# Patient Record
Sex: FEMALE | Race: WHITE | Employment: FULL TIME | URBAN - METROPOLITAN AREA
[De-identification: names, ages, dates, MRNs, and addresses within clinical notes are randomized per-mention and may not be internally consistent; named-entity substitution may affect disease eponyms.]

---

## 2017-01-04 ENCOUNTER — OFFICE VISIT (OUTPATIENT)
Dept: FAMILY MEDICINE CLINIC | Facility: CLINIC | Age: 47
End: 2017-01-04

## 2017-01-04 VITALS
WEIGHT: 127 LBS | RESPIRATION RATE: 18 BRPM | BODY MASS INDEX: 21 KG/M2 | HEART RATE: 78 BPM | OXYGEN SATURATION: 98 % | DIASTOLIC BLOOD PRESSURE: 62 MMHG | TEMPERATURE: 98 F | SYSTOLIC BLOOD PRESSURE: 110 MMHG

## 2017-01-04 DIAGNOSIS — J45.31 ASTHMA, ALLERGIC, MILD PERSISTENT, WITH ACUTE EXACERBATION: Primary | ICD-10-CM

## 2017-01-04 DIAGNOSIS — H69.81 EUSTACHIAN TUBE DYSFUNCTION, RIGHT: ICD-10-CM

## 2017-01-04 PROCEDURE — 99213 OFFICE O/P EST LOW 20 MIN: CPT | Performed by: FAMILY MEDICINE

## 2017-01-04 RX ORDER — ALBUTEROL SULFATE 90 UG/1
2 AEROSOL, METERED RESPIRATORY (INHALATION) EVERY 4 HOURS PRN
Qty: 1 INHALER | Refills: 0 | Status: SHIPPED | OUTPATIENT
Start: 2017-01-04 | End: 2017-09-25

## 2017-01-04 RX ORDER — ALBUTEROL SULFATE 90 UG/1
2 AEROSOL, METERED RESPIRATORY (INHALATION) EVERY 4 HOURS PRN
Qty: 3 INHALER | Refills: 1 | Status: SHIPPED | OUTPATIENT
Start: 2017-01-04 | End: 2017-04-04

## 2017-01-04 RX ORDER — ALBUTEROL SULFATE 90 UG/1
2 AEROSOL, METERED RESPIRATORY (INHALATION) EVERY 4 HOURS PRN
Qty: 3 INHALER | Refills: 1 | Status: SHIPPED | OUTPATIENT
Start: 2017-01-04 | End: 2017-01-04 | Stop reason: CLARIF

## 2017-01-04 RX ORDER — PREDNISONE 20 MG/1
20 TABLET ORAL DAILY
Qty: 6 TABLET | Refills: 0 | Status: SHIPPED | OUTPATIENT
Start: 2017-01-04 | End: 2017-01-07

## 2017-01-04 RX ORDER — INHALER, ASSIST DEVICES
SPACER (EA) MISCELLANEOUS
Qty: 1 DEVICE | Refills: 0 | Status: SHIPPED | OUTPATIENT
Start: 2017-01-04

## 2017-01-04 NOTE — PATIENT INSTRUCTIONS
Please start Qvar and use it daily as this controls the inflammation component of your asthma and the proair is a bronchodilator that will open up your airways, if you have inflammation the proair will not help.       Controlling Asthma Triggers: Irritants · Do not use products with bleach and ammonia for cleaning. Try making a cleaning solution with white vinegar, baking soda, or mild dish soap. · Use exhaust fans while cooking. Or open a window if you can.   · Do not use perfumes, air fresheners, potpourri

## 2017-01-04 NOTE — PROGRESS NOTES
Felicia Gloria is a 51year old female. Patient presents with: Follow - Up: asthma is not better after she finished antibiotic for sinus infection.      HPI:   Asthma: patient is seen for follow up, states finished her steroid and has been using her proa hx endoscopy   • Kidney stones 1/1994   • Migraine headache    • Borderline glaucoma    • Irritable bowel syndrome    • Endometriosis    • Depressive disorder, not elsewhere classified      Depression   • Allergic rhinitis due to pollen    • Unspecified as medication    Eustachian tube dysfunction, right  Advised to start fluticasone nasal spray

## 2017-01-05 ENCOUNTER — TELEPHONE (OUTPATIENT)
Dept: FAMILY MEDICINE CLINIC | Facility: CLINIC | Age: 47
End: 2017-01-05

## 2017-01-05 NOTE — TELEPHONE ENCOUNTER
Patient has question on Prednisone dose take 3 days as PCP advised ? Says she got 6 pills take qd on bottle. .  predniSONE 20 MG Oral Tab 6 tablet 0 1/4/2017 1/7/2017     Sig :  Take 1 tablet (20 mg total) by mouth daily. Please advise.

## 2017-01-05 NOTE — TELEPHONE ENCOUNTER
Clarified with dr Sarah Crenshaw  Pt is to take BID x 3 days      Telephone Information:   Mobile [phone] (home) [phone] (work)      Left detailed message for pt with this clarification  Advised to call back with any other questions or concer

## 2017-05-22 NOTE — TELEPHONE ENCOUNTER
No future appointments. LOV 1/17    LAST LAB 1/11    LAST RX    Montelukast Sodium 10 MG Oral Tab 90 tablet 1 10/4/2016     Out of Rx in April.    PROTOCOL  Asthma & COPD Medication Protocol Failed5/21 11:22 PM   Asthma Action Score greater than or equal

## 2017-05-24 NOTE — TELEPHONE ENCOUNTER
Please have patient make a follow up appointment, asthma was uncontrolled at her last visit and she was advised to follow up.

## 2017-05-24 NOTE — TELEPHONE ENCOUNTER
Called patient to schedule an appointment for asthma. Patient states she will have to get back with us. She has hours changing at work and isn't sure of everything at the moment.

## 2017-05-26 RX ORDER — MONTELUKAST SODIUM 10 MG/1
TABLET ORAL
Qty: 30 TABLET | Refills: 0 | Status: SHIPPED | OUTPATIENT
Start: 2017-05-26 | End: 2017-09-25

## 2017-05-26 NOTE — TELEPHONE ENCOUNTER
No future appointments. Left detailed message for patient on phone also informed of where refill will be sent. Rx filled x 1 month. Next refill at appointment.

## 2017-08-20 DIAGNOSIS — J45.40 ASTHMA, MODERATE PERSISTENT, POORLY-CONTROLLED: ICD-10-CM

## 2017-08-22 NOTE — TELEPHONE ENCOUNTER
No future appointments. LOV 1/17 5/17 Raya Dugan          1:46 PM   Note      Called patient to schedule an appointment for asthma. Patient states she will have to get back with us.   She has hours changing at work and isn't sure of everything at the

## 2017-09-07 ENCOUNTER — TELEPHONE (OUTPATIENT)
Dept: FAMILY MEDICINE CLINIC | Facility: CLINIC | Age: 47
End: 2017-09-07

## 2017-09-07 NOTE — TELEPHONE ENCOUNTER
Patient called because she's not sure why she's getting [employer] calls. She states she's not due for a visit. [facility] outreach is for Asthma. Last visit 1/4/2017. Please advise.

## 2017-09-08 NOTE — TELEPHONE ENCOUNTER
Pt notified that calls were regarding Asthma F/U from January  Her breathing was documented as very poor at that OV  We normally want to see pt's back in one month to reassess    Pt appreciated that F/U call but was at work and stated she would call back l

## 2017-09-25 ENCOUNTER — OFFICE VISIT (OUTPATIENT)
Dept: FAMILY MEDICINE CLINIC | Facility: CLINIC | Age: 47
End: 2017-09-25

## 2017-09-25 VITALS
SYSTOLIC BLOOD PRESSURE: 112 MMHG | BODY MASS INDEX: 22 KG/M2 | DIASTOLIC BLOOD PRESSURE: 64 MMHG | HEART RATE: 74 BPM | WEIGHT: 136.38 LBS | RESPIRATION RATE: 18 BRPM | TEMPERATURE: 98 F

## 2017-09-25 DIAGNOSIS — R20.2 PARESTHESIAS IN RIGHT HAND: ICD-10-CM

## 2017-09-25 DIAGNOSIS — J30.1 CHRONIC SEASONAL ALLERGIC RHINITIS DUE TO POLLEN: ICD-10-CM

## 2017-09-25 DIAGNOSIS — M79.601 ARM PAIN, RIGHT: Primary | ICD-10-CM

## 2017-09-25 DIAGNOSIS — M75.21 BICIPITAL TENDINITIS OF RIGHT SHOULDER: ICD-10-CM

## 2017-09-25 DIAGNOSIS — J45.40 ASTHMA, MODERATE PERSISTENT, WELL-CONTROLLED: ICD-10-CM

## 2017-09-25 DIAGNOSIS — Z12.39 ENCOUNTER FOR BREAST CANCER SCREENING OTHER THAN MAMMOGRAM: ICD-10-CM

## 2017-09-25 PROCEDURE — 99214 OFFICE O/P EST MOD 30 MIN: CPT | Performed by: FAMILY MEDICINE

## 2017-09-25 RX ORDER — MONTELUKAST SODIUM 10 MG/1
TABLET ORAL
Qty: 90 TABLET | Refills: 1 | Status: SHIPPED | COMMUNITY
Start: 2017-09-25 | End: 2017-11-09

## 2017-09-25 RX ORDER — ALBUTEROL SULFATE 90 UG/1
2 AEROSOL, METERED RESPIRATORY (INHALATION) EVERY 4 HOURS PRN
Qty: 1 INHALER | Refills: 5 | Status: SHIPPED | OUTPATIENT
Start: 2017-09-25 | End: 2017-09-25

## 2017-09-25 RX ORDER — ALBUTEROL SULFATE 90 UG/1
2 AEROSOL, METERED RESPIRATORY (INHALATION) EVERY 4 HOURS PRN
Qty: 1 INHALER | Refills: 5 | Status: SHIPPED | COMMUNITY
Start: 2017-09-25 | End: 2017-11-09

## 2017-09-25 NOTE — PATIENT INSTRUCTIONS
Please schedule physical therapy for your arm, if numbness in hand is not better will do a nerve conduction study. Controlling Asthma Triggers: Irritants  Irritants are things in the air that can trigger symptoms in some people with asthma or COPD.  Be · Do not use perfumes, air fresheners, potpourri, and other scented products. Other irritants  These are dust, aerosol sprays, and fine powders. · Wear a mask while doing tasks like sanding, dusting, sweeping, and yardwork.  Make sure any indoor work area

## 2017-09-28 ENCOUNTER — HOSPITAL ENCOUNTER (OUTPATIENT)
Dept: ULTRASOUND IMAGING | Age: 47
Discharge: HOME | End: 2017-09-28
Attending: FAMILY MEDICINE
Payer: COMMERCIAL

## 2017-09-28 DIAGNOSIS — M79.601 ARM PAIN, RIGHT: ICD-10-CM

## 2017-09-28 PROCEDURE — 93971 EXTREMITY STUDY: CPT | Performed by: FAMILY MEDICINE

## 2017-09-30 NOTE — PROGRESS NOTES
Alexy Mg is a 49year old female. Patient presents with: Follow - Up: Pt had a voluntary blood draw, now right arm has pain and was dx with nreve damage after having blood draw. Here to discuss it.     HPI:   Patient complaining of right arm pain a MG/0.3ML Injection Device Inject  as directed. Disp:  Rfl:    Fluticasone Propionate 50 MCG/ACT Nasal Suspension 2 sprays by Nasal route daily.  Disp: 3 Inhaler Rfl: 1      Past Medical History:   Diagnosis Date   • Allergic rhinitis due to pollen    • Jagdeep palpation over the biceps, tenderness in the bicipital groove. Mild tenderness to palpation with AROM of shoulder. ASSESSMENT AND PLAN:   Norris Washington was seen today for follow - up.     Diagnoses and all orders for this visit:    Arm pain, right  -     US

## 2017-10-04 ENCOUNTER — TELEPHONE (OUTPATIENT)
Dept: FAMILY MEDICINE CLINIC | Facility: CLINIC | Age: 47
End: 2017-10-04

## 2017-10-04 NOTE — TELEPHONE ENCOUNTER
Patient Result Comments     Written by Campbell Schirmer, MD on 10/2/2017  9:58 AM   No blood clot on ultrasound, please schedule a physical therapy appointment.

## 2017-10-04 NOTE — TELEPHONE ENCOUNTER
Pt called to get test results. I explained it says she is Active on [employer], but she said she receives notifications about messages sent to [employer] but cannot login. Has reset her password, but does not want to wait a week. Requested a call back.

## 2017-10-09 ENCOUNTER — OFFICE VISIT (OUTPATIENT)
Dept: PHYSICAL THERAPY | Age: 47
End: 2017-10-09
Attending: FAMILY MEDICINE
Payer: COMMERCIAL

## 2017-10-09 PROCEDURE — 97161 PT EVAL LOW COMPLEX 20 MIN: CPT

## 2017-10-09 PROCEDURE — 97530 THERAPEUTIC ACTIVITIES: CPT

## 2017-10-09 NOTE — PROGRESS NOTES
UPPER EXTREMITY EVALUATION:   Referring Physician: Dr. Heriberto Gr  Diagnosis: Right UE Contusion     Date of Service: 10/9/2017     PATIENT SUMMARY   Ariana Ramírez is a 49year old y/o female who presents to therapy today with complaints of dominant side examination findings of increased medial nerve neural tension and decreased flexibility of the biceps brachii musculature with MLT. All other findings of the orthopaedic and neuromuscular system are normal at this time.     Norris Washington would benefit from ski neural mobility of the right median nerve and full MLT of the right biceps Brachi for return to deskwork and gardening without modifications. Frequency / Duration: Patient will be seen for 2 x/week or a total of 8 visits over a 90 day period.  Treatmen

## 2017-10-11 ENCOUNTER — OFFICE VISIT (OUTPATIENT)
Dept: PHYSICAL THERAPY | Age: 47
End: 2017-10-11
Attending: FAMILY MEDICINE
Payer: COMMERCIAL

## 2017-10-11 PROCEDURE — 97140 MANUAL THERAPY 1/> REGIONS: CPT

## 2017-10-11 PROCEDURE — 97110 THERAPEUTIC EXERCISES: CPT

## 2017-10-11 NOTE — PROGRESS NOTES
Dx: Right Arm Parasthesia         Authorized # of Visits:  8         Next MD visit: none scheduled  Fall Risk: standard         Precautions: n/a             Subjective: States symptoms are unchanged.  Doing exercises for medial nerve mobility and biceps mob

## 2017-10-16 ENCOUNTER — OFFICE VISIT (OUTPATIENT)
Dept: PHYSICAL THERAPY | Age: 47
End: 2017-10-16
Attending: FAMILY MEDICINE
Payer: COMMERCIAL

## 2017-10-16 PROCEDURE — 97110 THERAPEUTIC EXERCISES: CPT

## 2017-10-16 PROCEDURE — 97140 MANUAL THERAPY 1/> REGIONS: CPT

## 2017-10-16 NOTE — PROGRESS NOTES
Dx: Right Arm Parasthesia         Authorized # of Visits:  8         Next MD visit: none scheduled  Fall Risk: standard         Precautions: n/a             Subjective: States she notes more mobility than with previous sessions.   Indicates that she was devan

## 2017-10-23 ENCOUNTER — OFFICE VISIT (OUTPATIENT)
Dept: PHYSICAL THERAPY | Age: 47
End: 2017-10-23
Attending: FAMILY MEDICINE
Payer: COMMERCIAL

## 2017-10-23 PROCEDURE — 97140 MANUAL THERAPY 1/> REGIONS: CPT

## 2017-10-23 PROCEDURE — 97110 THERAPEUTIC EXERCISES: CPT

## 2017-10-23 NOTE — PROGRESS NOTES
Dx: Right Arm Parasthesia         Authorized # of Visits:  8         Next MD visit: none scheduled  Fall Risk: standard         Precautions: n/a             Subjective: States she notes more mobility than with previous sessions.   Indicates that she was devan mobility of the right median nerve and full MLT of the right biceps Brachi for return to deskwork and gardening without modifications. Plan: Assess response to treatment and progress as tolerated. Consider DC. Charges:  TherEx 2 (09); Manual PT 1

## 2017-10-25 ENCOUNTER — APPOINTMENT (OUTPATIENT)
Dept: PHYSICAL THERAPY | Age: 47
End: 2017-10-25
Attending: FAMILY MEDICINE
Payer: COMMERCIAL

## 2017-10-27 ENCOUNTER — OFFICE VISIT (OUTPATIENT)
Dept: PHYSICAL THERAPY | Age: 47
End: 2017-10-27
Attending: FAMILY MEDICINE
Payer: COMMERCIAL

## 2017-10-27 PROCEDURE — 97140 MANUAL THERAPY 1/> REGIONS: CPT

## 2017-10-27 PROCEDURE — 97110 THERAPEUTIC EXERCISES: CPT

## 2017-10-27 NOTE — PROGRESS NOTES
Dx: Right Arm Parasthesia         Authorized # of Visits:  8         Next MD visit: none scheduled  Fall Risk: standard         Precautions: n/a             Subjective: No complaints. Able to complete just about all tasks.   Notes some endurance issues wit without modifications. Plan: DC PT. Charges:  TherEx 2 (25); Manual PT 1 (10 min)       Total Timed Treatment: 35 min  Total Treatment Time: 35 min

## 2017-10-30 ENCOUNTER — APPOINTMENT (OUTPATIENT)
Dept: PHYSICAL THERAPY | Age: 47
End: 2017-10-30
Attending: FAMILY MEDICINE
Payer: COMMERCIAL

## 2017-11-01 ENCOUNTER — APPOINTMENT (OUTPATIENT)
Dept: PHYSICAL THERAPY | Age: 47
End: 2017-11-01
Attending: FAMILY MEDICINE
Payer: COMMERCIAL

## 2017-11-06 ENCOUNTER — APPOINTMENT (OUTPATIENT)
Dept: PHYSICAL THERAPY | Age: 47
End: 2017-11-06
Attending: FAMILY MEDICINE
Payer: COMMERCIAL

## 2017-11-08 ENCOUNTER — APPOINTMENT (OUTPATIENT)
Dept: PHYSICAL THERAPY | Age: 47
End: 2017-11-08
Attending: FAMILY MEDICINE
Payer: COMMERCIAL

## 2017-11-08 DIAGNOSIS — J45.40 ASTHMA, MODERATE PERSISTENT, WELL-CONTROLLED: ICD-10-CM

## 2017-11-08 DIAGNOSIS — J30.1 CHRONIC SEASONAL ALLERGIC RHINITIS DUE TO POLLEN: ICD-10-CM

## 2017-11-08 NOTE — TELEPHONE ENCOUNTER
Patient stopped by the office and asked if we could send the following scripts to [facility]    Montelukast Sodium 10 MG Oral Tab     Beclomethasone Dipropionate (QVAR) 40 MCG/ACT Inhalation Aero     Albuterol Sulfate HFA (Proair HFA) 108 (90 Bas

## 2017-11-10 RX ORDER — MONTELUKAST SODIUM 10 MG/1
TABLET ORAL
Qty: 90 TABLET | Refills: 0 | Status: SHIPPED | OUTPATIENT
Start: 2017-11-10 | End: 2018-11-28

## 2017-11-10 RX ORDER — ALBUTEROL SULFATE 90 UG/1
2 AEROSOL, METERED RESPIRATORY (INHALATION) EVERY 4 HOURS PRN
Qty: 1 INHALER | Refills: 2 | Status: SHIPPED | OUTPATIENT
Start: 2017-11-10 | End: 2018-10-22

## 2017-11-10 NOTE — TELEPHONE ENCOUNTER
No future appointments. LOV 9/17    Return in about 6 months (around 3/25/2018),       LAST LAB 1/16     LAST RX     9/25/17 6 months    PROTOCOL  Asthma & COPD Medication Protocol Passed    Refilled x 3 months.

## 2018-10-22 DIAGNOSIS — J45.40 ASTHMA, MODERATE PERSISTENT, WELL-CONTROLLED: ICD-10-CM

## 2018-10-22 NOTE — TELEPHONE ENCOUNTER
Patient is scheduled for 11/28 for asthma,can only do evening appointments, but is requesting refill for her albuterol.

## 2018-10-23 RX ORDER — ALBUTEROL SULFATE 90 UG/1
2 AEROSOL, METERED RESPIRATORY (INHALATION) EVERY 4 HOURS PRN
Qty: 1 INHALER | Refills: 0 | Status: SHIPPED | OUTPATIENT
Start: 2018-10-23 | End: 2018-11-28

## 2018-10-23 NOTE — TELEPHONE ENCOUNTER
LOV 9/17    LAST AAP 1/17    LAST RX    Next OV 11/28/2018    PROTOCOL    Asthma & COPD Medication Protocol Uxtixi23/22 1:24 PM   Asthma Action Score greater than or equal to 20    AAP/ACT given in last 12 months     Refilled x 1. Next refill at appt.

## 2018-11-28 ENCOUNTER — OFFICE VISIT (OUTPATIENT)
Dept: FAMILY MEDICINE CLINIC | Facility: CLINIC | Age: 48
End: 2018-11-28
Payer: COMMERCIAL

## 2018-11-28 VITALS
BODY MASS INDEX: 22.78 KG/M2 | RESPIRATION RATE: 18 BRPM | OXYGEN SATURATION: 98 % | HEART RATE: 74 BPM | WEIGHT: 138.38 LBS | SYSTOLIC BLOOD PRESSURE: 102 MMHG | TEMPERATURE: 98 F | HEIGHT: 65.5 IN | DIASTOLIC BLOOD PRESSURE: 64 MMHG

## 2018-11-28 DIAGNOSIS — Z12.39 SCREENING FOR BREAST CANCER: ICD-10-CM

## 2018-11-28 DIAGNOSIS — M79.675 PAIN OF LEFT GREAT TOE: ICD-10-CM

## 2018-11-28 DIAGNOSIS — N95.1 MENOPAUSAL HOT FLUSHES: ICD-10-CM

## 2018-11-28 DIAGNOSIS — J30.1 CHRONIC SEASONAL ALLERGIC RHINITIS DUE TO POLLEN: ICD-10-CM

## 2018-11-28 DIAGNOSIS — Z51.81 ENCOUNTER FOR THERAPEUTIC DRUG MONITORING: Primary | ICD-10-CM

## 2018-11-28 DIAGNOSIS — J45.40 ASTHMA, MODERATE PERSISTENT, POORLY-CONTROLLED: ICD-10-CM

## 2018-11-28 PROCEDURE — 99214 OFFICE O/P EST MOD 30 MIN: CPT | Performed by: FAMILY MEDICINE

## 2018-11-28 RX ORDER — ALBUTEROL SULFATE 90 UG/1
2 AEROSOL, METERED RESPIRATORY (INHALATION) EVERY 4 HOURS PRN
Qty: 3 INHALER | Refills: 1 | Status: SHIPPED | OUTPATIENT
Start: 2018-11-28 | End: 2019-07-24

## 2018-11-28 RX ORDER — EPINEPHRINE 0.3 MG/.3ML
0.3 INJECTION SUBCUTANEOUS ONCE
Qty: 2 EACH | Refills: 0 | Status: SHIPPED | OUTPATIENT
Start: 2018-11-28 | End: 2019-07-24

## 2018-11-28 RX ORDER — MONTELUKAST SODIUM 10 MG/1
TABLET ORAL
Qty: 90 TABLET | Refills: 1 | Status: SHIPPED | OUTPATIENT
Start: 2018-11-28 | End: 2019-07-24

## 2018-11-29 NOTE — PROGRESS NOTES
Mychal Hull is a 51year old female. Patient presents with:  Asthma: 6 month f/u for asthma   Hip Pain    HPI:   Asthma: patient is seen for follow up and medications refill, states ran out of her medication and her asthma has been acting up. ACT 16. history of colonic polyps    • Gallbladder problem    • Irritable bowel syndrome    • Kidney stones 1/1994   • Migraine headache    • Peptic ulcer, unspecified site, unspecified as acute or chronic, without mention of hemorrhage, perforation, or obstructio HFA) 108 (90 Base) MCG/ACT Inhalation Aero Soln; Inhale 2 puffs into the lungs every 4 (four) hours as needed for Wheezing or Shortness of Breath. Encouraged patient to use her medications as prescribed and follow up.     Chronic seasonal allergic rhinitis

## 2018-11-29 NOTE — PATIENT INSTRUCTIONS
Big toe pain is likely arthritis in the joint, if pain worsens will refer to a podiatrist.  Understanding Menopause  Menopause marks the point where you’ve gone 12 months in a row without a period.  The average age for this is around 46, but it can happen Other ways for you to deal with symptoms are listed below. · Hot flashes. Wear layers that you can remove. Try all-cotton clothing, sheets, and blankets. Keep a glass of cold water by your bed. · Pain during sex.  You can buy a water-based lubricant or va

## 2018-12-01 DIAGNOSIS — J45.40 ASTHMA, MODERATE PERSISTENT, POORLY-CONTROLLED: ICD-10-CM

## 2019-07-24 ENCOUNTER — OFFICE VISIT (OUTPATIENT)
Dept: FAMILY MEDICINE CLINIC | Facility: CLINIC | Age: 49
End: 2019-07-24
Payer: COMMERCIAL

## 2019-07-24 VITALS
HEART RATE: 74 BPM | RESPIRATION RATE: 18 BRPM | SYSTOLIC BLOOD PRESSURE: 118 MMHG | WEIGHT: 132.13 LBS | DIASTOLIC BLOOD PRESSURE: 64 MMHG | OXYGEN SATURATION: 98 % | TEMPERATURE: 99 F | HEIGHT: 66 IN | BODY MASS INDEX: 21.23 KG/M2

## 2019-07-24 DIAGNOSIS — Z91.013 ALLERGY TO FISH: ICD-10-CM

## 2019-07-24 DIAGNOSIS — J30.1 CHRONIC SEASONAL ALLERGIC RHINITIS DUE TO POLLEN: ICD-10-CM

## 2019-07-24 DIAGNOSIS — M76.62 ACHILLES TENDINITIS, LEFT LEG: ICD-10-CM

## 2019-07-24 DIAGNOSIS — M72.2 PLANTAR FASCIITIS, BILATERAL: ICD-10-CM

## 2019-07-24 DIAGNOSIS — M25.50 MULTIPLE JOINT PAIN: ICD-10-CM

## 2019-07-24 DIAGNOSIS — J45.40 ASTHMA, MODERATE PERSISTENT, POORLY-CONTROLLED: Primary | ICD-10-CM

## 2019-07-24 DIAGNOSIS — G56.01 CARPAL TUNNEL SYNDROME OF RIGHT WRIST: ICD-10-CM

## 2019-07-24 PROCEDURE — 99214 OFFICE O/P EST MOD 30 MIN: CPT | Performed by: FAMILY MEDICINE

## 2019-07-24 RX ORDER — MONTELUKAST SODIUM 10 MG/1
TABLET ORAL
Qty: 90 TABLET | Refills: 1 | Status: SHIPPED | OUTPATIENT
Start: 2019-07-24 | End: 2020-03-11

## 2019-07-24 RX ORDER — ALBUTEROL SULFATE 90 UG/1
2 AEROSOL, METERED RESPIRATORY (INHALATION) EVERY 4 HOURS PRN
Qty: 3 INHALER | Refills: 1 | Status: SHIPPED | OUTPATIENT
Start: 2019-07-24 | End: 2020-03-11

## 2019-07-24 RX ORDER — EPINEPHRINE 0.3 MG/.3ML
0.3 INJECTION SUBCUTANEOUS ONCE
Qty: 2 EACH | Refills: 0 | Status: SHIPPED | OUTPATIENT
Start: 2019-07-24 | End: 2019-07-24

## 2019-07-24 NOTE — PROGRESS NOTES
Gareth Jason is a 47year old female. Patient presents with:  Joint Pain: Started in hands,and joints    HPI:   Complaining of chronic right hand numbness, pain and weakness for 20 yrs. 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 ASSESSMENT AND PLAN:   Lu King was seen today for joint pain.     Diagnoses and all orders for this visit:    Asthma, moderate persistent, poorly-controlled  -     Beclomethasone Dipropionate (QVAR) 40 M

## 2019-07-25 ENCOUNTER — MED REC SCAN ONLY (OUTPATIENT)
Dept: FAMILY MEDICINE CLINIC | Facility: CLINIC | Age: 49
End: 2019-07-25

## 2019-08-02 LAB
ABSOLUTE BASOPHILS: 81 CELLS/UL (ref 0–200)
ABSOLUTE EOSINOPHILS: 435 CELLS/UL (ref 15–500)
ABSOLUTE LYMPHOCYTES: 2047 CELLS/UL (ref 850–3900)
ABSOLUTE MONOCYTES: 394 CELLS/UL (ref 200–950)
ABSOLUTE NEUTROPHILS: 2842 CELLS/UL (ref 1500–7800)
ALMOND (F20) IGE: 0.14 KU/L
ANACHOICE(R) SCREEN: NEGATIVE
BASOPHILS: 1.4 %
C-REACTIVE PROTEIN: 1 MG/L
CASHEW NUT (F202) IGE: 0.12 KU/L
CLASS: 0
CLASS: 0
CLASS: 1
CLASS: 2
CODFISH (F3) IGE: 0.12 KU/L
EGG WHITE (F1) IGE: 1.25 KU/L
EOSINOPHILS: 7.5 %
HAZELNUT-FOOD, IGE: 0.19 KU/L
HEMATOCRIT: 42.7 % (ref 35–45)
HEMOGLOBIN: 14 G/DL (ref 11.7–15.5)
LYMPHOCYTES: 35.3 %
MCH: 28.5 PG (ref 27–33)
MCHC: 32.8 G/DL (ref 32–36)
MCV: 87 FL (ref 80–100)
MILK (F2) IGE: 0.86 KU/L
MONOCYTES: 6.8 %
MPV: 9.8 FL (ref 7.5–12.5)
NEUTROPHILS: 49 %
PEANUT (F13) IGE: 0.4 KU/L
PLATELET COUNT: 279 THOUSAND/UL (ref 140–400)
RDW: 12.5 % (ref 11–15)
RED BLOOD CELL COUNT: 4.91 MILLION/UL (ref 3.8–5.1)
RHEUMATOID FACTOR: <14 IU/ML
SALMON, IGE: <0.1 KU/L
SCALLOP (F338) IGE: 0.21 KU/L
SED RATE BY MODIFIED$WESTERGREN: 2 MM/H
SESAME SEED (F10) IGE: 3.43 KU/L
SHRIMP (F24) IGE: <0.1 KU/L
SOYBEAN (F14) IGE: 0.28 KU/L
TUNA (F40) IGE: 0.14 KU/L
WALNUT (F256) IGE: 0.15 KU/L
WHEAT (F4) IGE: 1.34 KU/L
WHITE BLOOD CELL COUNT: 5.8 THOUSAND/UL (ref 3.8–10.8)

## 2019-12-27 ENCOUNTER — TELEPHONE (OUTPATIENT)
Dept: FAMILY MEDICINE CLINIC | Facility: CLINIC | Age: 49
End: 2019-12-27

## 2019-12-27 DIAGNOSIS — J45.40 ASTHMA, MODERATE PERSISTENT, POORLY-CONTROLLED: ICD-10-CM

## 2019-12-27 RX ORDER — ALBUTEROL SULFATE 90 UG/1
2 AEROSOL, METERED RESPIRATORY (INHALATION) EVERY 4 HOURS PRN
Qty: 1 INHALER | Refills: 0 | Status: CANCELLED | OUTPATIENT
Start: 2019-12-27 | End: 2020-01-26

## 2019-12-27 NOTE — TELEPHONE ENCOUNTER
Pt out of inhaler medication and says asthma bad in this weather.  Knows she needs to make appt but looking for refill before weekend

## 2019-12-30 NOTE — TELEPHONE ENCOUNTER
LOV 7/19 Return in about 6 months (around 1/24/2020). For f/u refills.        LAST AAP 1/18    LAST RX   Medication Quantity Refills Start End   Beclomethasone Dipropionate (QVAR) 40 MCG/ACT Inhalation Aero Soln 3 Inhaler 1 7/24/2019          Next OV Visit

## 2020-01-10 NOTE — TELEPHONE ENCOUNTER
Pt called - scheduled her physical for 3-11-20. Cannot get in any sooner due to work. Pt is asking for this med to be filled. Going out of town.

## 2020-03-11 ENCOUNTER — OFFICE VISIT (OUTPATIENT)
Dept: FAMILY MEDICINE CLINIC | Facility: CLINIC | Age: 50
End: 2020-03-11
Payer: COMMERCIAL

## 2020-03-11 VITALS
OXYGEN SATURATION: 98 % | SYSTOLIC BLOOD PRESSURE: 112 MMHG | DIASTOLIC BLOOD PRESSURE: 68 MMHG | HEIGHT: 65.35 IN | RESPIRATION RATE: 18 BRPM | HEART RATE: 102 BPM | WEIGHT: 131 LBS | BODY MASS INDEX: 21.56 KG/M2 | TEMPERATURE: 98 F

## 2020-03-11 DIAGNOSIS — Z12.11 SCREENING FOR COLON CANCER: ICD-10-CM

## 2020-03-11 DIAGNOSIS — L72.0 EPIDERMAL CYST OF NECK: ICD-10-CM

## 2020-03-11 DIAGNOSIS — J30.1 CHRONIC SEASONAL ALLERGIC RHINITIS DUE TO POLLEN: ICD-10-CM

## 2020-03-11 DIAGNOSIS — J45.40 ASTHMA, MODERATE PERSISTENT, WELL-CONTROLLED: Primary | ICD-10-CM

## 2020-03-11 DIAGNOSIS — Z12.39 SCREENING FOR BREAST CANCER: ICD-10-CM

## 2020-03-11 PROCEDURE — 99214 OFFICE O/P EST MOD 30 MIN: CPT | Performed by: FAMILY MEDICINE

## 2020-03-11 RX ORDER — MONTELUKAST SODIUM 10 MG/1
TABLET ORAL
Qty: 90 TABLET | Refills: 1 | Status: SHIPPED | OUTPATIENT
Start: 2020-03-11 | End: 2021-04-14

## 2020-03-11 RX ORDER — ALBUTEROL SULFATE 90 UG/1
2 AEROSOL, METERED RESPIRATORY (INHALATION) EVERY 4 HOURS PRN
Qty: 3 INHALER | Refills: 1 | Status: SHIPPED | OUTPATIENT
Start: 2020-03-11 | End: 2020-04-10

## 2020-03-11 NOTE — PROGRESS NOTES
Jaimie William is a 45year old female. Patient presents with:  Asthma  Acrochordon: back of neck    HPI:   Jaimie William is a 45year old female with asthma seen for follow up and medication refill.   ACT 23, well controlled, states has been compliant perforation, or obstruction     PUD x 3, hx endoscopy   • Personal history of anaphylaxis    • Personal history of kidney stones    • Unspecified asthma(493.90)     moderate   • Unspecified asthma, with exacerbation       Social History:  Social History neck  -     DERM - INTERNAL    Screening for colon cancer  -     GASTRO - INTERNAL    Screening for breast cancer  -     DONNELL SCREENING BILAT (CPT=77067);  Future

## 2020-03-11 NOTE — PATIENT INSTRUCTIONS
Controlling Asthma Triggers: Irritants  Irritants are things in the air that can trigger symptoms in some people with asthma or COPD. Below are some common irritants. Some are tiny particles and others are dissolved in the air.  You will also find tips to · Wear a mask while doing tasks such as sanding, dusting, sweeping, and yardwork. Make sure any indoor work area is well-ventilated. Open doors and windows when doing these tasks. · Use pump spray bottles instead of aerosols.   · Pour liquid  inste

## 2020-03-13 ENCOUNTER — TELEPHONE (OUTPATIENT)
Dept: FAMILY MEDICINE CLINIC | Facility: CLINIC | Age: 50
End: 2020-03-13

## 2020-03-13 NOTE — TELEPHONE ENCOUNTER
This is not on the patients formulary. Sent what is. Beclomethasone Dipropionate (QVAR) 40 MCG/ACT Inhalation Aero Soln 3 Inhaler 1 3/11/2020 6/9/2020   Sig:   Inhale 1 puff into the lungs 2 (two) times daily.  INHALE 1 PUFF INTO THE LUNGS TWICE DAILY

## 2020-03-18 PROBLEM — J45.909 ASTHMA, WELL CONTROLLED: Status: ACTIVE | Noted: 2020-03-18

## 2020-03-18 PROBLEM — J45.909 ASTHMA, WELL CONTROLLED (HCC): Status: ACTIVE | Noted: 2020-03-18

## 2020-03-30 DIAGNOSIS — J45.40 ASTHMA, MODERATE PERSISTENT, WELL-CONTROLLED: ICD-10-CM

## 2020-03-30 RX ORDER — ALBUTEROL SULFATE 90 UG/1
2 AEROSOL, METERED RESPIRATORY (INHALATION) EVERY 4 HOURS PRN
Qty: 3 INHALER | Refills: 1 | OUTPATIENT
Start: 2020-03-30 | End: 2020-04-29

## 2020-03-30 NOTE — TELEPHONE ENCOUNTER
Patient called and stated that at appointment she thought her albuterol was refilled, however it did not come with her other rx's and she needs a refill.  Sent to mail order pharmacy/

## 2020-03-30 NOTE — TELEPHONE ENCOUNTER
LOV 3/11/2020    LAST LAB    LAST RX 3-11-20 3*1    Next OV  No future appointments.     PROTOCOL    Albuterol Sulfate HFA (PROAIR HFA) 108 (90 Base) MCG/ACT Inhalation Aero Soln               Sig: Inhale 2 puffs into the lungs every 4 (four) hours as neede

## 2020-04-21 ENCOUNTER — TELEPHONE (OUTPATIENT)
Dept: FAMILY MEDICINE CLINIC | Facility: CLINIC | Age: 50
End: 2020-04-21

## 2020-04-21 RX ORDER — ALBUTEROL SULFATE 2.5 MG/3ML
2.5 SOLUTION RESPIRATORY (INHALATION) EVERY 6 HOURS PRN
Qty: 50 VIAL | Refills: 0 | Status: SHIPPED | OUTPATIENT
Start: 2020-04-21 | End: 2021-05-12

## 2020-04-21 NOTE — TELEPHONE ENCOUNTER
LOV 3/20    ACT 23, well controlled, states has been compliant with her medications.  Denies cough, SOB or wheezing.       Last given in 2015    Albuterol Sulfate (VENTOLIN) (2.5 MG/3ML) 0.083% Inhalation Nebu Soln (Discontinued) 1 Box 3 3/24/2015     Rx fi

## 2020-04-21 NOTE — TELEPHONE ENCOUNTER
Patient stated she was in office 3/11 and asked for refills on her meds including Nebulizer liquid albuterol. She checked with Pharmacy and they don't have the nebulizer med for her.     She asked if we would send it  into the [facility] 140

## 2020-08-27 LAB
AMB EXT CHOL/HDL RATIO: 20
AMB EXT CHOLESTEROL, TOTAL: 212 MG/DL
AMB EXT CREATININE: 0.8 MG/DL
AMB EXT GLUCOSE: 80 MG/DL
AMB EXT HDL CHOLESTEROL: 106 MG/DL
AMB EXT HEMATOCRIT: 41.1
AMB EXT HEMOGLOBIN: 13.7
AMB EXT HGBA1C: 5.3 %
AMB EXT LDL CHOLESTEROL, DIRECT: 95 MG/DL
AMB EXT MCV: 88
AMB EXT TRIGLYCERIDES: 55 MG/DL
AMB EXT VLDL: 11 MG/DL
AMB EXT WBC: 5.4 X10(3)UL

## 2020-09-22 ENCOUNTER — TELEPHONE (OUTPATIENT)
Dept: FAMILY MEDICINE CLINIC | Facility: CLINIC | Age: 50
End: 2020-09-22

## 2021-01-12 ENCOUNTER — TELEPHONE (OUTPATIENT)
Dept: FAMILY MEDICINE CLINIC | Facility: CLINIC | Age: 51
End: 2021-01-12

## 2021-01-12 NOTE — TELEPHONE ENCOUNTER
Detailed VMML and requested call back with symptom detail. Informed based on information, may need covid testing. Advised to push fluids, warm tea w honey/lemon, salt water gargle, OTC throat lozenges or spray, tylenol/ibuprofen for pain.  Instructed to s

## 2021-01-12 NOTE — TELEPHONE ENCOUNTER
Pt says she just returned from [facility] on Sunday and has been experiencing a sore throat, hot sweats at night, and no fever. States shes been gargling to try to help. Her neck is swollen and it hurts to swallow.

## 2021-01-29 ENCOUNTER — PATIENT OUTREACH (OUTPATIENT)
Dept: FAMILY MEDICINE CLINIC | Facility: CLINIC | Age: 51
End: 2021-01-29

## 2021-03-03 ENCOUNTER — PATIENT OUTREACH (OUTPATIENT)
Dept: FAMILY MEDICINE CLINIC | Facility: CLINIC | Age: 51
End: 2021-03-03

## 2021-03-19 ENCOUNTER — PATIENT OUTREACH (OUTPATIENT)
Dept: FAMILY MEDICINE CLINIC | Facility: CLINIC | Age: 51
End: 2021-03-19

## 2021-04-12 ENCOUNTER — TELEPHONE (OUTPATIENT)
Dept: FAMILY MEDICINE CLINIC | Facility: CLINIC | Age: 51
End: 2021-04-12

## 2021-04-12 ENCOUNTER — APPOINTMENT (OUTPATIENT)
Dept: GENERAL RADIOLOGY | Facility: HOSPITAL | Age: 51
End: 2021-04-12
Attending: EMERGENCY MEDICINE
Payer: COMMERCIAL

## 2021-04-12 ENCOUNTER — HOSPITAL ENCOUNTER (EMERGENCY)
Facility: HOSPITAL | Age: 51
Discharge: HOME | End: 2021-04-12
Attending: EMERGENCY MEDICINE
Payer: COMMERCIAL

## 2021-04-12 VITALS
DIASTOLIC BLOOD PRESSURE: 75 MMHG | OXYGEN SATURATION: 98 % | SYSTOLIC BLOOD PRESSURE: 118 MMHG | HEIGHT: 66 IN | HEART RATE: 78 BPM | RESPIRATION RATE: 16 BRPM | BODY MASS INDEX: 20.89 KG/M2 | WEIGHT: 130 LBS | TEMPERATURE: 97 F

## 2021-04-12 DIAGNOSIS — J45.901 EXACERBATION OF ASTHMA, UNSPECIFIED ASTHMA SEVERITY, UNSPECIFIED WHETHER PERSISTENT: Primary | ICD-10-CM

## 2021-04-12 PROCEDURE — 99285 EMERGENCY DEPT VISIT HI MDM: CPT

## 2021-04-12 PROCEDURE — 85025 COMPLETE CBC W/AUTO DIFF WBC: CPT | Performed by: EMERGENCY MEDICINE

## 2021-04-12 PROCEDURE — 93010 ELECTROCARDIOGRAM REPORT: CPT

## 2021-04-12 PROCEDURE — 94640 AIRWAY INHALATION TREATMENT: CPT

## 2021-04-12 PROCEDURE — 93005 ELECTROCARDIOGRAM TRACING: CPT

## 2021-04-12 PROCEDURE — 96374 THER/PROPH/DIAG INJ IV PUSH: CPT

## 2021-04-12 PROCEDURE — 71045 X-RAY EXAM CHEST 1 VIEW: CPT | Performed by: EMERGENCY MEDICINE

## 2021-04-12 PROCEDURE — 84484 ASSAY OF TROPONIN QUANT: CPT | Performed by: EMERGENCY MEDICINE

## 2021-04-12 PROCEDURE — 94644 CONT INHLJ TX 1ST HOUR: CPT

## 2021-04-12 PROCEDURE — 80053 COMPREHEN METABOLIC PANEL: CPT | Performed by: EMERGENCY MEDICINE

## 2021-04-12 RX ORDER — ALBUTEROL SULFATE 90 UG/1
8 AEROSOL, METERED RESPIRATORY (INHALATION) ONCE
Status: COMPLETED | OUTPATIENT
Start: 2021-04-12 | End: 2021-04-12

## 2021-04-12 RX ORDER — METHYLPREDNISOLONE SODIUM SUCCINATE 125 MG/2ML
125 INJECTION, POWDER, LYOPHILIZED, FOR SOLUTION INTRAMUSCULAR; INTRAVENOUS ONCE
Status: COMPLETED | OUTPATIENT
Start: 2021-04-12 | End: 2021-04-12

## 2021-04-12 RX ORDER — ALBUTEROL SULFATE 2.5 MG/3ML
2.5 SOLUTION RESPIRATORY (INHALATION) EVERY 4 HOURS PRN
Qty: 30 AMPULE | Refills: 0 | Status: SHIPPED | OUTPATIENT
Start: 2021-04-12 | End: 2021-04-14

## 2021-04-12 RX ORDER — PREDNISONE 20 MG/1
40 TABLET ORAL DAILY
Qty: 10 TABLET | Refills: 0 | Status: SHIPPED | OUTPATIENT
Start: 2021-04-12 | End: 2021-04-17

## 2021-04-12 RX ORDER — BECLOMETHASONE DIPROPIONATE HFA 40 UG/1
1 AEROSOL, METERED RESPIRATORY (INHALATION) 2 TIMES DAILY
Qty: 1 INHALER | Refills: 0 | Status: SHIPPED | OUTPATIENT
Start: 2021-04-12 | End: 2021-04-14

## 2021-04-12 NOTE — TELEPHONE ENCOUNTER
Asthma Attack, still having SOB, coughing from bronchitis. using nebulizer frequqntly has 5 vials left. Rescue inhaler is not helping at all. Wants to avoid Immediate Care and ER.

## 2021-04-12 NOTE — ED PROVIDER NOTES
Patient Seen in: [facility] Emergency Department      History   Patient presents with:  Difficulty Breathing    Stated Complaint: difficulty in breathing / low SpO2    HPI/Subjective:   HPI    18-year-old female presents for evaluation of difficulty drinks      Types: 3 Glasses of wine per week      Comment: 3-4 wine or beer on weekends    Drug use: No             Review of Systems    Positive for stated complaint: difficulty in breathing / low SpO2  Other systems are as noted in HPI.   Constitutional orders. RAINBOW DRAW BLUE   RAINBOW DRAW LAVENDER   RAINBOW DRAW LIGHT GREEN   RAINBOW DRAW GOLD     EKG    Rate, intervals and axes as noted on EKG Report.   Rate: 77  Rhythm: Sinus Rhythm  Reading: no ST elevation or depression                  MDM continuing symptoms.   Return to ER recommended if worsening or changing symptoms arise prior to follow-up                 Disposition and Plan     Clinical Impression:  Exacerbation of asthma, unspecified asthma severity, unspecified whether persistent  (p

## 2021-04-12 NOTE — TELEPHONE ENCOUNTER
S/w patient again and she stated she has an issue with ER and UC. She stated she will go to [facility] to be seen.

## 2021-04-12 NOTE — TELEPHONE ENCOUNTER
Incoming triage call, spoke to pt that states over the past few days she had fevers up to 101 and cough. Pt states she has been taking albuterol inhaler, using nebulizer and sudafed. Indicated she is certain she had an asthma attack on 4/10/21.  Pt states a

## 2021-04-14 ENCOUNTER — OFFICE VISIT (OUTPATIENT)
Dept: FAMILY MEDICINE CLINIC | Facility: CLINIC | Age: 51
End: 2021-04-14
Payer: COMMERCIAL

## 2021-04-14 VITALS
WEIGHT: 130 LBS | DIASTOLIC BLOOD PRESSURE: 78 MMHG | HEART RATE: 77 BPM | SYSTOLIC BLOOD PRESSURE: 118 MMHG | HEIGHT: 65 IN | OXYGEN SATURATION: 98 % | TEMPERATURE: 97 F | BODY MASS INDEX: 21.66 KG/M2 | RESPIRATION RATE: 18 BRPM

## 2021-04-14 DIAGNOSIS — G47.09 OTHER INSOMNIA: ICD-10-CM

## 2021-04-14 DIAGNOSIS — N95.1 MENOPAUSAL HOT FLUSHES: ICD-10-CM

## 2021-04-14 DIAGNOSIS — K21.9 GASTROESOPHAGEAL REFLUX DISEASE, UNSPECIFIED WHETHER ESOPHAGITIS PRESENT: ICD-10-CM

## 2021-04-14 DIAGNOSIS — Z02.89 ENCOUNTER FOR COMPLETION OF FORM WITH PATIENT: ICD-10-CM

## 2021-04-14 DIAGNOSIS — H93.13 TINNITUS OF BOTH EARS: ICD-10-CM

## 2021-04-14 DIAGNOSIS — J45.41 MODERATE PERSISTENT ASTHMA WITH ACUTE EXACERBATION: Primary | ICD-10-CM

## 2021-04-14 DIAGNOSIS — G43.009 MIGRAINE WITHOUT AURA AND WITHOUT STATUS MIGRAINOSUS, NOT INTRACTABLE: ICD-10-CM

## 2021-04-14 DIAGNOSIS — J30.1 CHRONIC SEASONAL ALLERGIC RHINITIS DUE TO POLLEN: ICD-10-CM

## 2021-04-14 PROCEDURE — 3008F BODY MASS INDEX DOCD: CPT | Performed by: FAMILY MEDICINE

## 2021-04-14 PROCEDURE — 99214 OFFICE O/P EST MOD 30 MIN: CPT | Performed by: FAMILY MEDICINE

## 2021-04-14 PROCEDURE — 3078F DIAST BP <80 MM HG: CPT | Performed by: FAMILY MEDICINE

## 2021-04-14 PROCEDURE — 3074F SYST BP LT 130 MM HG: CPT | Performed by: FAMILY MEDICINE

## 2021-04-14 RX ORDER — MONTELUKAST SODIUM 10 MG/1
TABLET ORAL
Qty: 90 TABLET | Refills: 1 | Status: SHIPPED | OUTPATIENT
Start: 2021-04-14 | End: 2022-01-17

## 2021-04-14 RX ORDER — BECLOMETHASONE DIPROPIONATE HFA 40 UG/1
1 AEROSOL, METERED RESPIRATORY (INHALATION) 2 TIMES DAILY
Qty: 3 INHALER | Refills: 1 | Status: SHIPPED | OUTPATIENT
Start: 2021-04-14 | End: 2021-07-13

## 2021-04-14 NOTE — PROGRESS NOTES
Denisha Montiel is a 45year old female. Patient presents with:  Asthma  ER F/U    HPI:   Denisha Montiel is a 45year old female with past medical history of allergies and asthma seen for follow-up from ER.   Patient states ran out of her Qvar a month ago 3 Inhaler 1   • Beclomethasone Diprop HFA (QVAR REDIHALER) 40 MCG/ACT Inhalation Aerosol, Breath Activated Inhale 1 puff into the lungs 2 (two) times a day.  3 Inhaler 1   • Montelukast Sodium 10 MG Oral Tab TAKE 1 TABLET NIGHTLY 90 tablet 1   • predniSONE rashes  ENT: as per HPI  RESPIRATORY: as per HPI  CARDIOVASCULAR: denies chest pain   GI: denies abdominal pain   NEURO: as per HPI    EXAM:   /78   Pulse 77   Temp 97.2 °F (36.2 °C) (Temporal)   Resp 18   Ht 5' 5\" (1.651 m)   Wt 130 lb (59 kg)   Sp both ears    Encounter for completion of form with patient  Will complete and fax forms    Menopausal hot flushes  Advised to try otc supplements like evening primrose oil to see if it helps.     Other insomnia  advised good sleep hygiene can continue to ta

## 2021-04-14 NOTE — PATIENT INSTRUCTIONS
Stop taking Excedrin as this can trigger your reflux symptoms, try over-the-counter magnesium 400 to 500 mg daily as prevention for migraine headaches.     Please take over-the-counter Nexium 20 mg or double up and take 40 mg daily to help with your reflux types of reactions in some people. For example, a runny nose, itchy, watery eyes, or a skin rash. Do your best to stay away from allergens that trigger symptoms. The tips below can help reduce any reaction you may have to certain allergens.     Dust mites live behind the walls if there has been water damage. · Clean damp areas weekly to prevent mold growth. This includes shower stalls and sinks. You may need someone to clean these areas for you. Or try wearing a mask. · Run an exhaust fan while bathing.  O migraines. Know your triggers  Be aware that you may have more than one trigger, and that some triggers may work together. Common migraine triggers include:   · Food and nutrition. Skipping meals or not drinking enough water can trigger headaches.  So can [id number] The [facility] 4037. All rights reserved. This information is not intended as a substitute for professional medical care. Always follow your healthcare professional's instructions.         Treating Insomnia     Learning to relax before bedti earplugs. · Don't eat a large meal just before bedtime. If you are hungry, eat a light, healthy snack. · Remove noises, bright lights, TVs, cell phones, and computers from your sleeping environment. · Use a comfortable mattress and pillow.   Learn to rel

## 2021-04-22 ENCOUNTER — TELEPHONE (OUTPATIENT)
Dept: FAMILY MEDICINE CLINIC | Facility: CLINIC | Age: 51
End: 2021-04-22

## 2021-04-22 NOTE — TELEPHONE ENCOUNTER
Pharmacy stated Ventolin is not covered by Patient's ins. Plan. Albuterol Pro Air Generic is covered.     Please send new script for the Albuterol Pro Air Generic to:    Carrie Baer, [facility] [phone],

## 2021-04-22 NOTE — TELEPHONE ENCOUNTER
Patient stated at her appointment that the generic did not work for her. Please heck with her if she would like me to sent the prescription.

## 2021-05-05 DIAGNOSIS — J45.41 MODERATE PERSISTENT ASTHMA WITH ACUTE EXACERBATION: ICD-10-CM

## 2021-05-05 NOTE — TELEPHONE ENCOUNTER
LOV     LAST LAB 4/12/21     LAST RX   VENTOLIN  (90 Base) MCG/ACT Inhalation Aero Soln 3 Inhaler 1 4/14/2021    Sig: Haleigh Hurst 2 puffs into the lungs every 4 (four) hours as needed for Wheezing.            Next OV   Future Appointments   Date Time Pr

## 2021-05-12 ENCOUNTER — TELEPHONE (OUTPATIENT)
Dept: FAMILY MEDICINE CLINIC | Facility: CLINIC | Age: 51
End: 2021-05-12

## 2021-05-12 ENCOUNTER — OFFICE VISIT (OUTPATIENT)
Dept: FAMILY MEDICINE CLINIC | Facility: CLINIC | Age: 51
End: 2021-05-12
Payer: COMMERCIAL

## 2021-05-12 VITALS
TEMPERATURE: 97 F | WEIGHT: 137 LBS | OXYGEN SATURATION: 98 % | DIASTOLIC BLOOD PRESSURE: 62 MMHG | BODY MASS INDEX: 22.82 KG/M2 | SYSTOLIC BLOOD PRESSURE: 120 MMHG | RESPIRATION RATE: 18 BRPM | HEART RATE: 81 BPM | HEIGHT: 65 IN

## 2021-05-12 DIAGNOSIS — Z00.00 ROUTINE GENERAL MEDICAL EXAMINATION AT A HEALTH CARE FACILITY: Primary | ICD-10-CM

## 2021-05-12 DIAGNOSIS — Z12.31 ENCOUNTER FOR SCREENING MAMMOGRAM FOR MALIGNANT NEOPLASM OF BREAST: ICD-10-CM

## 2021-05-12 DIAGNOSIS — Z12.11 SCREENING FOR COLON CANCER: ICD-10-CM

## 2021-05-12 DIAGNOSIS — Z01.419 ENCOUNTER FOR ROUTINE GYNECOLOGICAL EXAMINATION WITH PAPANICOLAOU SMEAR OF CERVIX: ICD-10-CM

## 2021-05-12 DIAGNOSIS — L20.82 FLEXURAL ECZEMA: ICD-10-CM

## 2021-05-12 PROCEDURE — 3008F BODY MASS INDEX DOCD: CPT | Performed by: FAMILY MEDICINE

## 2021-05-12 PROCEDURE — 88175 CYTOPATH C/V AUTO FLUID REDO: CPT | Performed by: FAMILY MEDICINE

## 2021-05-12 PROCEDURE — 99396 PREV VISIT EST AGE 40-64: CPT | Performed by: FAMILY MEDICINE

## 2021-05-12 PROCEDURE — 3078F DIAST BP <80 MM HG: CPT | Performed by: FAMILY MEDICINE

## 2021-05-12 PROCEDURE — 87624 HPV HI-RISK TYP POOLED RSLT: CPT | Performed by: FAMILY MEDICINE

## 2021-05-12 PROCEDURE — 3074F SYST BP LT 130 MM HG: CPT | Performed by: FAMILY MEDICINE

## 2021-05-12 NOTE — TELEPHONE ENCOUNTER
ventolin inhaler is not covered by insurance, alternative suggested is ProAir. Fax sent to office on 5/6/2021. Ref# [id number].

## 2021-05-12 NOTE — PATIENT INSTRUCTIONS

## 2021-05-12 NOTE — PROGRESS NOTES
Alex Lamar is a 45year old female. Patient presents with:  Physical    HPI:   Alex Lamar is a 45year old female with history of asthma seen for her annual physical and Pap. Patient has not done a Pap in a long time.   States never did a mammog HFA (QVAR REDIHALER) 40 MCG/ACT Inhalation Aerosol, Breath Activated Inhale 1 puff into the lungs 2 (two) times a day.  3 Inhaler 1   • Montelukast Sodium 10 MG Oral Tab TAKE 1 TABLET NIGHTLY 90 tablet 1   • albuterol sulfate (2.5 MG/3ML) 0.083% Inhalation change. HENT: Negative for congestion, ear pain, hearing loss and sore throat. Eyes: Negative for discharge, redness and visual disturbance. Respiratory: Negative for cough, chest tightness and shortness of breath.     Cardiovascular: Negative for ch Comments: Breast: symmetrical bilateral. Texture dense, no skin changes, no nipple retraction or discharge, no axillary lymphadenopathy. Chest:      Chest wall: No tenderness. Abdominal:      General: Abdomen is flat.  Bowel sounds are normal. There is with Papanicolaou smear of cervix  -     THINPREP PAP SMEAR B; Future  -     HPV HIGH RISK , THIN PREP COLLECTION; Future    Flexural eczema  -     triamcinolone acetonide 0.1 % External Cream; Apply topically 2 (two) times daily as needed.

## 2021-06-08 RX ORDER — ALBUTEROL SULFATE 90 UG/1
2 AEROSOL, METERED RESPIRATORY (INHALATION) EVERY 4 HOURS PRN
OUTPATIENT
Start: 2021-06-08

## 2021-12-22 ENCOUNTER — TELEPHONE (OUTPATIENT)
Dept: FAMILY MEDICINE CLINIC | Facility: CLINIC | Age: 51
End: 2021-12-22

## 2021-12-22 DIAGNOSIS — J45.41 MODERATE PERSISTENT ASTHMA WITH ACUTE EXACERBATION: ICD-10-CM

## 2021-12-22 RX ORDER — ALBUTEROL SULFATE 90 UG/1
2 AEROSOL, METERED RESPIRATORY (INHALATION) EVERY 6 HOURS PRN
Qty: 18 G | Refills: 0 | Status: SHIPPED | OUTPATIENT
Start: 2021-12-22

## 2021-12-22 NOTE — TELEPHONE ENCOUNTER
Called pt to confirm is using [facility] delivery services (which appears to be same as [facility]), and [facility]. Pt requesting refill of Albuterol, indicating per [facility] no longer has refill available on file.   Pt is currently

## 2021-12-22 NOTE — TELEPHONE ENCOUNTER
Called pt to inform per PCP indicated sent prescription refill to both mail order and local [facility] Prudence Santos as listed. Informed pt will need to confirm manufactures with pharmacy as prefers [employer]. No further questions or concerns.  Pt verbalized [place]

## 2021-12-22 NOTE — TELEPHONE ENCOUNTER
[facility] Pt looking for albuterol and teva? Asked for fax and she said they cannot send faxed request for refills.      Provided phone #- [phone]    Fax- [phone]

## 2022-01-14 DIAGNOSIS — J30.1 CHRONIC SEASONAL ALLERGIC RHINITIS DUE TO POLLEN: ICD-10-CM

## 2022-01-14 NOTE — TELEPHONE ENCOUNTER
LOV 5/12/2021    LAST LAB    LAST RX 4-14-21 90*\1    Next OV No future appointments.     PROTOCOL  Name from pharmacy: MONTELUKAST SODIUM TABS 10MG          Will file in chart as: MONTELUKAST 10 MG Oral Tab    Sig: TAKE 1 TABLET NIGHTLY    Disp:  90 tablet

## 2022-01-16 NOTE — TELEPHONE ENCOUNTER
Patient is due for 6 month follow up, please have her schedule an appointment and will send a refill after

## 2022-01-17 RX ORDER — MONTELUKAST SODIUM 10 MG/1
TABLET ORAL
Qty: 90 TABLET | Refills: 0 | Status: SHIPPED | OUTPATIENT
Start: 2022-01-17 | End: 2022-01-18

## 2022-01-17 NOTE — TELEPHONE ENCOUNTER
Asthma & COPD Medication Protocol Passed 01/17/2022 03:53 PM    Asthma Action Score greater than or equal to 20    Appointment in past 6 or next 3 months     AAP/ACT given in last 12 months

## 2022-01-18 ENCOUNTER — OFFICE VISIT (OUTPATIENT)
Dept: FAMILY MEDICINE CLINIC | Facility: CLINIC | Age: 52
End: 2022-01-18
Payer: COMMERCIAL

## 2022-01-18 ENCOUNTER — TELEPHONE (OUTPATIENT)
Dept: FAMILY MEDICINE CLINIC | Facility: CLINIC | Age: 52
End: 2022-01-18

## 2022-01-18 VITALS
OXYGEN SATURATION: 98 % | BODY MASS INDEX: 22.82 KG/M2 | SYSTOLIC BLOOD PRESSURE: 120 MMHG | TEMPERATURE: 98 F | WEIGHT: 137 LBS | DIASTOLIC BLOOD PRESSURE: 82 MMHG | HEART RATE: 82 BPM | HEIGHT: 65 IN | RESPIRATION RATE: 18 BRPM

## 2022-01-18 DIAGNOSIS — J45.41 MODERATE PERSISTENT ASTHMA WITH ACUTE EXACERBATION: ICD-10-CM

## 2022-01-18 DIAGNOSIS — J30.1 CHRONIC SEASONAL ALLERGIC RHINITIS DUE TO POLLEN: Primary | ICD-10-CM

## 2022-01-18 DIAGNOSIS — K21.9 GASTROESOPHAGEAL REFLUX DISEASE, UNSPECIFIED WHETHER ESOPHAGITIS PRESENT: ICD-10-CM

## 2022-01-18 PROCEDURE — 99213 OFFICE O/P EST LOW 20 MIN: CPT | Performed by: FAMILY MEDICINE

## 2022-01-18 PROCEDURE — 3079F DIAST BP 80-89 MM HG: CPT | Performed by: FAMILY MEDICINE

## 2022-01-18 PROCEDURE — 3008F BODY MASS INDEX DOCD: CPT | Performed by: FAMILY MEDICINE

## 2022-01-18 PROCEDURE — 3074F SYST BP LT 130 MM HG: CPT | Performed by: FAMILY MEDICINE

## 2022-01-18 RX ORDER — MONTELUKAST SODIUM 10 MG/1
10 TABLET ORAL NIGHTLY
Qty: 30 TABLET | Refills: 0 | Status: SHIPPED | OUTPATIENT
Start: 2022-01-18

## 2022-01-18 RX ORDER — MONTELUKAST SODIUM 10 MG/1
10 TABLET ORAL NIGHTLY
Qty: 90 TABLET | Refills: 1 | Status: SHIPPED | OUTPATIENT
Start: 2022-01-18 | End: 2022-01-18 | Stop reason: CLARIF

## 2022-01-18 RX ORDER — ALBUTEROL SULFATE 90 UG/1
2 AEROSOL, METERED RESPIRATORY (INHALATION) EVERY 6 HOURS PRN
Qty: 18 G | Refills: 0 | Status: CANCELLED | OUTPATIENT
Start: 2022-01-18

## 2022-01-18 RX ORDER — BECLOMETHASONE DIPROPIONATE HFA 40 UG/1
1 AEROSOL, METERED RESPIRATORY (INHALATION) 2 TIMES DAILY
Qty: 3 EACH | Refills: 1 | Status: SHIPPED | OUTPATIENT
Start: 2022-01-18 | End: 2022-04-18

## 2022-01-18 RX ORDER — ALBUTEROL SULFATE 90 UG/1
2 AEROSOL, METERED RESPIRATORY (INHALATION) EVERY 6 HOURS PRN
Qty: 3 EACH | Refills: 1 | Status: SHIPPED | OUTPATIENT
Start: 2022-01-18

## 2022-01-18 RX ORDER — MONTELUKAST SODIUM 10 MG/1
10 TABLET ORAL NIGHTLY
Qty: 90 TABLET | Refills: 1 | Status: SHIPPED | OUTPATIENT
Start: 2022-01-18

## 2022-01-18 NOTE — TELEPHONE ENCOUNTER
Patient stated that Kelin is telling her that refill for Montelukast  is too soon. She needs to pick this one from local pharmacy because she is out of medication. She said last time she didn't have the medication, she got pnumonia.     Please conta

## 2022-01-18 NOTE — PROGRESS NOTES
Gino Unger is a 45year old female. Patient presents with:  Asthma: refill    HPI:   Gino Unger is a 45year old female with history of asthma, seasonal allergies seen for follow up and medication refill.     States asthma is well controlled with Borderline glaucoma    • Depressive disorder, not elsewhere classified     Depression   • Endometriosis    • Family history of colonic polyps    • Gallbladder problem    • Irritable bowel syndrome    • Kidney stones 1/1994   • Migraine headache    • Peptic Beclomethasone Diprop HFA (QVAR REDIHALER) 40 MCG/ACT Inhalation Aerosol, Breath Activated; Inhale 1 puff into the lungs 2 (two) times daily. -     albuterol (PROAIR HFA) 108 (90 Base) MCG/ACT Inhalation Aero Soln;  Inhale 2 puffs into the lungs every 6 (s

## 2022-01-19 NOTE — TELEPHONE ENCOUNTER
Called [facility]. They state patient's insurance will not cover partial rx for montelukast because they have already paid the mail order pharmacy for the #90. Patient can pay out of pocket $40 for 10 pills if she would like.     Called patient and advised

## 2022-03-11 ENCOUNTER — PATIENT MESSAGE (OUTPATIENT)
Dept: FAMILY MEDICINE CLINIC | Facility: CLINIC | Age: 52
End: 2022-03-11

## 2022-03-11 RX ORDER — ALBUTEROL SULFATE 90 UG/1
2 AEROSOL, METERED RESPIRATORY (INHALATION) EVERY 6 HOURS PRN
Qty: 3 EACH | Refills: 1 | Status: SHIPPED | OUTPATIENT
Start: 2022-03-11

## 2022-03-11 NOTE — TELEPHONE ENCOUNTER
Asthma & COPD Medication Protocol Passed 03/11/2022 10:43 AM    Asthma Action Score greater than or equal to 20    Appointment in past 6 or next 3 months     AAP/ACT given in last 12 months
Pt calling stating that she is about to be out of her nebulizer solution. Needs a refill as soon as possible. Her entire family has bronchitis and she does not want to run out and get pneumonia.  Please advise     Sent to lanny in [facility]
None

## 2022-03-14 NOTE — TELEPHONE ENCOUNTER
From: Simeon Radford  To: Colin Albarran MD  Sent: 3/11/2022 3:33 PM CST  Subject: Albuterol vials for my nebulizer     The refill that went in is incorrect, I need the Albuterol Sulfate Inhalation solution, 0.083%.  2.5 mg/3mL

## 2022-03-15 RX ORDER — ALBUTEROL SULFATE 2.5 MG/3ML
2.5 SOLUTION RESPIRATORY (INHALATION) EVERY 6 HOURS PRN
Qty: 100 EACH | Refills: 2 | Status: SHIPPED | OUTPATIENT
Start: 2022-03-15

## 2022-03-15 NOTE — TELEPHONE ENCOUNTER
LOV 01-18-22. See also refill request from 03-11-22    Patient stating she needs albuterol solution for nebulizer, last prescribed 04-21-20. Would you rather have patient use rescue inhaler or nebulizer?      Pended refill, per patient's request.

## 2022-05-13 ENCOUNTER — HOSPITAL ENCOUNTER (OUTPATIENT)
Age: 52
Discharge: HOME | End: 2022-05-13
Attending: EMERGENCY MEDICINE
Payer: COMMERCIAL

## 2022-05-13 VITALS
HEART RATE: 87 BPM | WEIGHT: 132 LBS | OXYGEN SATURATION: 97 % | SYSTOLIC BLOOD PRESSURE: 127 MMHG | HEIGHT: 66 IN | BODY MASS INDEX: 21.21 KG/M2 | TEMPERATURE: 99 F | DIASTOLIC BLOOD PRESSURE: 62 MMHG | RESPIRATION RATE: 18 BRPM

## 2022-05-13 DIAGNOSIS — J30.2 SEASONAL ALLERGIC RHINITIS, UNSPECIFIED TRIGGER: ICD-10-CM

## 2022-05-13 DIAGNOSIS — J20.8 ACUTE VIRAL BRONCHITIS: Primary | ICD-10-CM

## 2022-05-13 LAB — SARS-COV-2 RNA RESP QL NAA+PROBE: NOT DETECTED

## 2022-05-13 PROCEDURE — 99213 OFFICE O/P EST LOW 20 MIN: CPT

## 2022-05-13 RX ORDER — BENZONATATE 100 MG/1
100 CAPSULE ORAL 3 TIMES DAILY PRN
Qty: 30 CAPSULE | Refills: 0 | Status: SHIPPED | OUTPATIENT
Start: 2022-05-13 | End: 2022-06-12

## 2022-05-13 RX ORDER — FLUTICASONE PROPIONATE 50 MCG
2 SPRAY, SUSPENSION (ML) NASAL DAILY
Qty: 16 G | Refills: 0 | Status: SHIPPED | OUTPATIENT
Start: 2022-05-13 | End: 2022-06-12

## 2022-05-13 RX ORDER — PREDNISONE 20 MG/1
40 TABLET ORAL DAILY
Qty: 10 TABLET | Refills: 0 | Status: SHIPPED | OUTPATIENT
Start: 2022-05-13 | End: 2022-05-18

## 2022-05-13 NOTE — ED INITIAL ASSESSMENT (HPI)
Cough - started Tuesday, took mucinex. Pt has been using her inhaler, and neb tx, qvar, but still not feeling any better. Pt is vaccinated with booster. Chest congestion- since Tuesday, and   Tightness Thursday. Denies fever. Pt concerned Bronchitis.

## 2022-07-13 ENCOUNTER — TELEPHONE (OUTPATIENT)
Dept: FAMILY MEDICINE CLINIC | Facility: CLINIC | Age: 52
End: 2022-07-13

## 2022-08-03 ENCOUNTER — OFFICE VISIT (OUTPATIENT)
Dept: FAMILY MEDICINE CLINIC | Facility: CLINIC | Age: 52
End: 2022-08-03
Payer: COMMERCIAL

## 2022-08-03 VITALS
HEART RATE: 63 BPM | BODY MASS INDEX: 22.18 KG/M2 | SYSTOLIC BLOOD PRESSURE: 102 MMHG | OXYGEN SATURATION: 98 % | TEMPERATURE: 97 F | DIASTOLIC BLOOD PRESSURE: 80 MMHG | RESPIRATION RATE: 18 BRPM | HEIGHT: 66 IN | WEIGHT: 138 LBS

## 2022-08-03 DIAGNOSIS — Z12.11 SCREENING FOR COLON CANCER: ICD-10-CM

## 2022-08-03 DIAGNOSIS — Z12.31 ENCOUNTER FOR SCREENING MAMMOGRAM FOR MALIGNANT NEOPLASM OF BREAST: ICD-10-CM

## 2022-08-03 DIAGNOSIS — Z87.11 HISTORY OF PEPTIC ULCER DISEASE: ICD-10-CM

## 2022-08-03 DIAGNOSIS — J30.1 CHRONIC SEASONAL ALLERGIC RHINITIS DUE TO POLLEN: Primary | ICD-10-CM

## 2022-08-03 DIAGNOSIS — K21.9 GASTROESOPHAGEAL REFLUX DISEASE, UNSPECIFIED WHETHER ESOPHAGITIS PRESENT: ICD-10-CM

## 2022-08-03 DIAGNOSIS — J45.40 MODERATE PERSISTENT ASTHMA WITHOUT COMPLICATION: ICD-10-CM

## 2022-08-03 PROCEDURE — 3008F BODY MASS INDEX DOCD: CPT | Performed by: FAMILY MEDICINE

## 2022-08-03 PROCEDURE — 3079F DIAST BP 80-89 MM HG: CPT | Performed by: FAMILY MEDICINE

## 2022-08-03 PROCEDURE — 99214 OFFICE O/P EST MOD 30 MIN: CPT | Performed by: FAMILY MEDICINE

## 2022-08-03 PROCEDURE — 3074F SYST BP LT 130 MM HG: CPT | Performed by: FAMILY MEDICINE

## 2022-08-03 RX ORDER — TRIAMCINOLONE ACETONIDE 1 MG/G
CREAM TOPICAL 2 TIMES DAILY PRN
Qty: 60 G | Refills: 0 | Status: CANCELLED | OUTPATIENT
Start: 2022-08-03

## 2022-08-03 RX ORDER — ALBUTEROL SULFATE 90 UG/1
2 AEROSOL, METERED RESPIRATORY (INHALATION) EVERY 6 HOURS PRN
Qty: 3 EACH | Refills: 1 | Status: SHIPPED | OUTPATIENT
Start: 2022-08-03

## 2022-08-03 RX ORDER — ALBUTEROL SULFATE 90 UG/1
2 AEROSOL, METERED RESPIRATORY (INHALATION) EVERY 6 HOURS PRN
Qty: 18 G | Refills: 0 | Status: CANCELLED | OUTPATIENT
Start: 2022-08-03

## 2022-08-03 RX ORDER — MONTELUKAST SODIUM 10 MG/1
10 TABLET ORAL NIGHTLY
Qty: 90 TABLET | Refills: 1 | Status: SHIPPED | OUTPATIENT
Start: 2022-08-03 | End: 2022-11-01

## 2022-09-19 ENCOUNTER — TELEPHONE (OUTPATIENT)
Dept: FAMILY MEDICINE CLINIC | Facility: CLINIC | Age: 52
End: 2022-09-19

## 2022-09-21 ENCOUNTER — OFFICE VISIT (OUTPATIENT)
Dept: FAMILY MEDICINE CLINIC | Facility: CLINIC | Age: 52
End: 2022-09-21

## 2022-09-21 VITALS
TEMPERATURE: 98 F | WEIGHT: 136 LBS | SYSTOLIC BLOOD PRESSURE: 110 MMHG | BODY MASS INDEX: 21.86 KG/M2 | HEART RATE: 64 BPM | RESPIRATION RATE: 18 BRPM | HEIGHT: 66 IN | OXYGEN SATURATION: 98 % | DIASTOLIC BLOOD PRESSURE: 80 MMHG

## 2022-09-21 DIAGNOSIS — S62.102D CLOSED FRACTURE OF LEFT WRIST WITH ROUTINE HEALING, SUBSEQUENT ENCOUNTER: ICD-10-CM

## 2022-09-21 DIAGNOSIS — S22.080D COMPRESSION FRACTURE OF T12 VERTEBRA WITH ROUTINE HEALING, SUBSEQUENT ENCOUNTER: ICD-10-CM

## 2022-09-21 DIAGNOSIS — M79.10 MYALGIA: ICD-10-CM
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
## 2022-09-21 DIAGNOSIS — M53.3 COCCYDYNIA: ICD-10-CM

## 2022-09-21 DIAGNOSIS — S22.32XD CLOSED FRACTURE OF ONE RIB OF LEFT SIDE WITH ROUTINE HEALING, SUBSEQUENT ENCOUNTER: ICD-10-CM

## 2022-09-21 DIAGNOSIS — M62.830 SPASM OF MUSCLE OF LOWER BACK: ICD-10-CM

## 2022-09-21 PROCEDURE — 3074F SYST BP LT 130 MM HG: CPT | Performed by: FAMILY MEDICINE

## 2022-09-21 PROCEDURE — 3079F DIAST BP 80-89 MM HG: CPT | Performed by: FAMILY MEDICINE

## 2022-09-21 PROCEDURE — 99214 OFFICE O/P EST MOD 30 MIN: CPT | Performed by: FAMILY MEDICINE

## 2022-09-21 PROCEDURE — 3008F BODY MASS INDEX DOCD: CPT | Performed by: FAMILY MEDICINE

## 2022-09-21 RX ORDER — TIZANIDINE 2 MG/1
2 TABLET ORAL EVERY 8 HOURS PRN
Qty: 21 TABLET | Refills: 0 | Status: SHIPPED | OUTPATIENT
Start: 2022-09-21 | End: 2022-09-28

## 2022-09-21 RX ORDER — HYDROCODONE BITARTRATE AND ACETAMINOPHEN 5; 325 MG/1; MG/1
1 TABLET ORAL
COMMUNITY
Start: 2022-09-18 | End: 2022-09-21

## 2022-09-21 NOTE — PATIENT INSTRUCTIONS
Continue Norco, alternate heat and ice. Please take the muscle relaxer, you can take it at night as it can make you drowsy or up to 3 times a day. Please follow up with ortho.

## 2022-09-26 ENCOUNTER — PATIENT MESSAGE (OUTPATIENT)
Dept: FAMILY MEDICINE CLINIC | Facility: CLINIC | Age: 52
End: 2022-09-26

## 2022-09-26 ENCOUNTER — TELEPHONE (OUTPATIENT)
Dept: FAMILY MEDICINE CLINIC | Facility: CLINIC | Age: 52
End: 2022-09-26

## 2022-09-26 NOTE — TELEPHONE ENCOUNTER
Patient sent FMLA paperwork through [employer] message. Needs  to complete. She was just in for OV 9/21/22. Please advise.  Placing paperwork in bin by triage

## 2022-09-27 ENCOUNTER — TELEPHONE (OUTPATIENT)
Dept: FAMILY MEDICINE CLINIC | Facility: CLINIC | Age: 52
End: 2022-09-27

## 2022-09-27 DIAGNOSIS — M84.48XD PATHOLOGICAL FRACTURE OF VERTEBRA WITH ROUTINE HEALING, UNSPECIFIED PATHOLOGICAL CAUSE, SUBSEQUENT ENCOUNTER: Primary | ICD-10-CM

## 2022-09-27 NOTE — TELEPHONE ENCOUNTER
Pt called asking to speak to nurse about calcium supplements that were suggested by specialists that were seen due to her recent hospital stay.

## 2022-09-28 ENCOUNTER — MED REC SCAN ONLY (OUTPATIENT)
Dept: FAMILY MEDICINE CLINIC | Facility: CLINIC | Age: 52
End: 2022-09-28

## 2022-09-28 NOTE — TELEPHONE ENCOUNTER
[phone]  Called pt to inform per PCP indicated to take OTC calcium 600mg BID. Will work on [employer] and fax on Friday (9/30). Informed per PCP will only be able to provide FMLA through 10/5/22- if Ortho wants pt to continue to wear cast will need to get FMLA from Ortho- pt agrees and has already discussed with Ortho as they want pt to wear cast for 4 weeks, recheck XR, and then another 4 weeks, pt will f/u with Ortho on Monday. DEXA ordered- provided CS#. No further questions or concerns. Pt verbalized understanding and agreed with POC.

## 2022-09-28 NOTE — TELEPHONE ENCOUNTER
Patient can take 600 mg of calcium 2 times a day, will work on her [employer] papers tomorrow and fax on Friday.   DEXA ordered

## 2022-09-28 NOTE — TELEPHONE ENCOUNTER
Called patient and advised we have forwarded her questions to Dr. Hiral Bustillos. We will let her know as soon as she gets back to us. Patient also asking if Dr. Hiral Bustillos can finish [place] paperwork. She needs it by Monday if at all possible. Dr. Hiral Bustillos,  Also see [facility] message from 9/26/22.

## 2022-10-01 ENCOUNTER — PATIENT MESSAGE (OUTPATIENT)
Dept: FAMILY MEDICINE CLINIC | Facility: CLINIC | Age: 52
End: 2022-10-01

## 2022-10-03 ENCOUNTER — MED REC SCAN ONLY (OUTPATIENT)
Dept: FAMILY MEDICINE CLINIC | Facility: CLINIC | Age: 52
End: 2022-10-03

## 2022-10-07 DIAGNOSIS — M62.830 SPASM OF MUSCLE OF LOWER BACK: ICD-10-CM

## 2022-10-07 NOTE — TELEPHONE ENCOUNTER
Called back to let her know FMLA paperwork was ready for . She asked if doctor could send over a refill of the muscle relaxer to her [facility]. She is out of town this weekend, so she won't be able to  until Monday anyways.  Please advise    tiZANidine 2 MG Oral Tab    [id number] Soheila Romkati [address], [phone], [phone]

## 2022-10-07 NOTE — TELEPHONE ENCOUNTER
Doctor finished last 2 pages. Called and advised patient ready for pickup. She will  next week OR have daughter luisito .  Copy sent to scanning already

## 2022-10-11 RX ORDER — TIZANIDINE 2 MG/1
2 TABLET ORAL EVERY 8 HOURS PRN
Qty: 21 TABLET | Refills: 0 | OUTPATIENT
Start: 2022-10-11 | End: 2022-10-18

## 2022-11-22 ENCOUNTER — OFFICE VISIT (OUTPATIENT)
Dept: FAMILY MEDICINE CLINIC | Facility: CLINIC | Age: 52
End: 2022-11-22
Payer: COMMERCIAL

## 2022-11-22 VITALS
HEIGHT: 66 IN | OXYGEN SATURATION: 99 % | DIASTOLIC BLOOD PRESSURE: 60 MMHG | RESPIRATION RATE: 16 BRPM | SYSTOLIC BLOOD PRESSURE: 112 MMHG | TEMPERATURE: 98 F | WEIGHT: 133 LBS | HEART RATE: 80 BPM | BODY MASS INDEX: 21.38 KG/M2

## 2022-11-22 DIAGNOSIS — S22.080D COMPRESSION FRACTURE OF T12 VERTEBRA WITH ROUTINE HEALING, SUBSEQUENT ENCOUNTER: ICD-10-CM

## 2022-11-22 DIAGNOSIS — S62.102D CLOSED FRACTURE OF LEFT WRIST WITH ROUTINE HEALING, SUBSEQUENT ENCOUNTER: Primary | ICD-10-CM

## 2022-11-22 PROCEDURE — 99213 OFFICE O/P EST LOW 20 MIN: CPT | Performed by: FAMILY MEDICINE

## 2022-11-22 PROCEDURE — 3074F SYST BP LT 130 MM HG: CPT | Performed by: FAMILY MEDICINE

## 2022-11-22 PROCEDURE — 3008F BODY MASS INDEX DOCD: CPT | Performed by: FAMILY MEDICINE

## 2022-11-22 PROCEDURE — 3078F DIAST BP <80 MM HG: CPT | Performed by: FAMILY MEDICINE

## 2022-11-22 RX ORDER — BECLOMETHASONE DIPROPIONATE HFA 40 UG/1
AEROSOL, METERED RESPIRATORY (INHALATION)
COMMUNITY
Start: 2022-11-11

## 2022-11-22 RX ORDER — HYDROCODONE BITARTRATE AND ACETAMINOPHEN 5; 325 MG/1; MG/1
TABLET ORAL
COMMUNITY
Start: 2022-09-27

## 2022-11-22 RX ORDER — ACETAMINOPHEN 500 MG
1000 TABLET ORAL AS DIRECTED
COMMUNITY
Start: 2022-09-18

## 2023-01-05 ENCOUNTER — MED REC SCAN ONLY (OUTPATIENT)
Dept: FAMILY MEDICINE CLINIC | Facility: CLINIC | Age: 53
End: 2023-01-05

## 2023-03-07 NOTE — TELEPHONE ENCOUNTER
Pt scheduled for 4-26-23 @ 5:40. Pt needed after work. This keren was 1st available that fits her schedule.

## 2023-03-09 RX ORDER — BECLOMETHASONE DIPROPIONATE HFA 40 UG/1
AEROSOL, METERED RESPIRATORY (INHALATION)
Qty: 31.8 G | Refills: 5 | Status: SHIPPED | OUTPATIENT
Start: 2023-03-09

## 2023-04-26 ENCOUNTER — OFFICE VISIT (OUTPATIENT)
Dept: FAMILY MEDICINE CLINIC | Facility: CLINIC | Age: 53
End: 2023-04-26
Payer: COMMERCIAL

## 2023-04-26 VITALS
SYSTOLIC BLOOD PRESSURE: 114 MMHG | HEIGHT: 66 IN | BODY MASS INDEX: 22.66 KG/M2 | TEMPERATURE: 97 F | WEIGHT: 141 LBS | OXYGEN SATURATION: 98 % | RESPIRATION RATE: 16 BRPM | HEART RATE: 79 BPM | DIASTOLIC BLOOD PRESSURE: 78 MMHG

## 2023-04-26 DIAGNOSIS — Z12.11 SCREENING FOR COLON CANCER: ICD-10-CM

## 2023-04-26 DIAGNOSIS — K21.9 GASTROESOPHAGEAL REFLUX DISEASE, UNSPECIFIED WHETHER ESOPHAGITIS PRESENT: ICD-10-CM

## 2023-04-26 DIAGNOSIS — J30.1 CHRONIC SEASONAL ALLERGIC RHINITIS DUE TO POLLEN: ICD-10-CM

## 2023-04-26 DIAGNOSIS — M25.532 WRIST PAIN, LEFT: ICD-10-CM

## 2023-04-26 DIAGNOSIS — J45.30 MILD PERSISTENT ASTHMA WITHOUT COMPLICATION: Primary | ICD-10-CM

## 2023-04-26 DIAGNOSIS — Z12.31 ENCOUNTER FOR SCREENING MAMMOGRAM FOR MALIGNANT NEOPLASM OF BREAST: ICD-10-CM

## 2023-04-26 PROCEDURE — 3008F BODY MASS INDEX DOCD: CPT | Performed by: FAMILY MEDICINE

## 2023-04-26 PROCEDURE — 3074F SYST BP LT 130 MM HG: CPT | Performed by: FAMILY MEDICINE

## 2023-04-26 PROCEDURE — 99213 OFFICE O/P EST LOW 20 MIN: CPT | Performed by: FAMILY MEDICINE

## 2023-04-26 PROCEDURE — 3078F DIAST BP <80 MM HG: CPT | Performed by: FAMILY MEDICINE

## 2023-04-26 RX ORDER — MONTELUKAST SODIUM 10 MG/1
10 TABLET ORAL NIGHTLY
Qty: 90 TABLET | Refills: 1 | Status: SHIPPED | OUTPATIENT
Start: 2023-04-26 | End: 2023-10-23

## 2023-04-26 RX ORDER — BECLOMETHASONE DIPROPIONATE HFA 40 UG/1
1 AEROSOL, METERED RESPIRATORY (INHALATION) 2 TIMES DAILY
Qty: 31.8 G | Refills: 1 | Status: SHIPPED | OUTPATIENT
Start: 2023-04-26

## 2023-04-26 RX ORDER — ALBUTEROL SULFATE 90 UG/1
2 AEROSOL, METERED RESPIRATORY (INHALATION) EVERY 6 HOURS PRN
Qty: 3 EACH | Refills: 1 | Status: SHIPPED | OUTPATIENT
Start: 2023-04-26

## 2023-06-13 NOTE — TELEPHONE ENCOUNTER
I don't recall seeing it. Price (Do Not Change): 0.00 Instructions: This plan will send the code FBSE to the PM system.  DO NOT or CHANGE the price. Detail Level: Simple

## 2023-09-12 LAB
AMB EXT BILIRUBIN, TOTAL: 0.5 MG/DL
AMB EXT CALCIUM: 9.3
AMB EXT CARBON DIOXIDE: 20
AMB EXT CHLORIDE: 100
AMB EXT CHOL/HDL RATIO: 2.5
AMB EXT CHOLESTEROL, TOTAL: 257 MG/DL
AMB EXT CMP ALT: 20 U/L
AMB EXT CMP AST: 25 U/L
AMB EXT CREATININE: 0.63 MG/DL
AMB EXT EGFR NON-AA: 105
AMB EXT GLUCOSE: 76 MG/DL
AMB EXT HDL CHOLESTEROL: 109 MG/DL
AMB EXT HEMATOCRIT: 42.4
AMB EXT HEMOGLOBIN: 14.1
AMB EXT HGBA1C: 5.4 %
AMB EXT LDL CHOLESTEROL, DIRECT: 129 MG/DL
AMB EXT MCV: 88
AMB EXT PLATELETS: 288
AMB EXT POSTASSIUM: 4.1 MMOL/L
AMB EXT SODIUM: 140 MMOL/L
AMB EXT TOTAL PROTEIN: 7.8
AMB EXT TRIGLYCERIDES: 113 MG/DL
AMB EXT WBC: 5.7 X10(3)UL

## 2023-11-08 ENCOUNTER — OFFICE VISIT (OUTPATIENT)
Dept: FAMILY MEDICINE CLINIC | Facility: CLINIC | Age: 53
End: 2023-11-08
Payer: COMMERCIAL

## 2023-11-08 VITALS
RESPIRATION RATE: 18 BRPM | BODY MASS INDEX: 23.63 KG/M2 | DIASTOLIC BLOOD PRESSURE: 68 MMHG | TEMPERATURE: 98 F | OXYGEN SATURATION: 98 % | SYSTOLIC BLOOD PRESSURE: 112 MMHG | WEIGHT: 147 LBS | HEART RATE: 80 BPM | HEIGHT: 66 IN

## 2023-11-08 DIAGNOSIS — J45.30 MILD PERSISTENT ASTHMA WITHOUT COMPLICATION: ICD-10-CM

## 2023-11-08 DIAGNOSIS — Z78.0 POSTMENOPAUSAL: ICD-10-CM

## 2023-11-08 DIAGNOSIS — Z23 NEED FOR VACCINATION: ICD-10-CM

## 2023-11-08 DIAGNOSIS — Z12.11 SCREENING FOR COLON CANCER: ICD-10-CM

## 2023-11-08 DIAGNOSIS — Z00.00 ROUTINE GENERAL MEDICAL EXAMINATION AT A HEALTH CARE FACILITY: Primary | ICD-10-CM

## 2023-11-08 DIAGNOSIS — J30.1 CHRONIC SEASONAL ALLERGIC RHINITIS DUE TO POLLEN: ICD-10-CM

## 2023-11-08 DIAGNOSIS — H93.13 TINNITUS OF BOTH EARS: ICD-10-CM

## 2023-11-08 LAB — IRON: 82

## 2023-11-08 PROCEDURE — 99396 PREV VISIT EST AGE 40-64: CPT | Performed by: FAMILY MEDICINE

## 2023-11-08 PROCEDURE — 3074F SYST BP LT 130 MM HG: CPT | Performed by: FAMILY MEDICINE

## 2023-11-08 PROCEDURE — 3078F DIAST BP <80 MM HG: CPT | Performed by: FAMILY MEDICINE

## 2023-11-08 PROCEDURE — 90715 TDAP VACCINE 7 YRS/> IM: CPT | Performed by: FAMILY MEDICINE

## 2023-11-08 PROCEDURE — 90677 PCV20 VACCINE IM: CPT | Performed by: FAMILY MEDICINE

## 2023-11-08 PROCEDURE — 99213 OFFICE O/P EST LOW 20 MIN: CPT | Performed by: FAMILY MEDICINE

## 2023-11-08 PROCEDURE — 90471 IMMUNIZATION ADMIN: CPT | Performed by: FAMILY MEDICINE

## 2023-11-08 PROCEDURE — 3008F BODY MASS INDEX DOCD: CPT | Performed by: FAMILY MEDICINE

## 2023-11-08 PROCEDURE — 90472 IMMUNIZATION ADMIN EACH ADD: CPT | Performed by: FAMILY MEDICINE

## 2023-11-08 RX ORDER — BECLOMETHASONE DIPROPIONATE HFA 40 UG/1
1 AEROSOL, METERED RESPIRATORY (INHALATION) 2 TIMES DAILY
Qty: 31.8 G | Refills: 1 | Status: SHIPPED | OUTPATIENT
Start: 2023-11-08

## 2023-11-08 RX ORDER — ALBUTEROL SULFATE 90 UG/1
2 AEROSOL, METERED RESPIRATORY (INHALATION) EVERY 6 HOURS PRN
Qty: 3 EACH | Refills: 1 | Status: SHIPPED | OUTPATIENT
Start: 2023-11-08

## 2023-11-08 RX ORDER — MONTELUKAST SODIUM 10 MG/1
10 TABLET ORAL NIGHTLY
Qty: 90 TABLET | Refills: 1 | Status: SHIPPED | OUTPATIENT
Start: 2023-11-08 | End: 2024-05-06

## 2025-08-26 ENCOUNTER — NURSE TRIAGE (OUTPATIENT)
Dept: FAMILY MEDICINE CLINIC | Facility: CLINIC | Age: 55
End: 2025-08-26

## 2025-08-26 ENCOUNTER — TELEMEDICINE (OUTPATIENT)
Dept: FAMILY MEDICINE CLINIC | Facility: CLINIC | Age: 55
End: 2025-08-26

## 2025-08-26 DIAGNOSIS — U07.1 COVID-19 VIRUS INFECTION: Primary | ICD-10-CM

## 2025-08-26 DIAGNOSIS — J45.41 MODERATE PERSISTENT ASTHMA WITH ACUTE EXACERBATION (HCC): ICD-10-CM

## 2025-08-26 PROCEDURE — 98005 SYNCH AUDIO-VIDEO EST LOW 20: CPT | Performed by: FAMILY MEDICINE

## (undated) DIAGNOSIS — J45.41 MODERATE PERSISTENT ASTHMA WITH ACUTE EXACERBATION: Primary | ICD-10-CM

## (undated) DIAGNOSIS — J45.41 MODERATE PERSISTENT ASTHMA WITH ACUTE EXACERBATION: ICD-10-CM

## (undated) NOTE — LETTER
Date: 9/21/2022    Patient Name: Quyen Tapia          To Whom it may concern: The above patient was seen at the [facility] for treatment of a medical condition. This patient should be excused from attending work 9/21/22 through 10/5/22 as she needs to follow up with a specialist for her wrist fracture and is still in pain.      Further recommendations to return to work to be decided by the specialist.        Sincerely,    Shayna Hameed MD

## (undated) NOTE — LETTER
12/28/18        Shahbaz Toscano  [address]      Dear Cele Lopez,    [facility] records indicate that you have outstanding lab work and or testing that was ordered for you and has not yet been completed:  Orders Placed This Encoun

## (undated) NOTE — LETTER
Patient Name: Katarzyna Muniz  YOB: 1970          MRN number:  [id number]  Date:  10/9/2017  Referring Physician:  Amelia Alcala          UPPER EXTREMITY EVALUATION:    Referring Physician: Dr. Helena Perry  Diagnosis: Right UE Contusion     D medial neural tension and increased bicepital tightness secondary to patient not using the upper extremity as fluidly as prior to a soft tissue injury, sustained 9/12/2017.   This is supported by her examination findings of increased medial nerve neural ten Charges: PT Eval Low Complexity, TherAct 1      Total Timed Treatment: 45 min     Total Treatment Time: 45 min     PLAN OF CARE:    Goals:    1. Increase FOTO > 11% from INE.     2. Patient will have painfree and full neural mobility of the right median ner

## (undated) NOTE — LETTER
ASTHMA ACTION PLAN for Epi Rey     : 1970     Date: 2018  Provider:  [facility], MD  Phone for doctor or clinic: Ariele[facility] Mckee [place] [address], Mikel# [address] [facility] [phone]

## (undated) NOTE — LETTER
Date: 11/22/2022    Patient Name: Carmel Syd    To Whom it may concern:    Ms. Jane Castro is clear to return to work on 11/28/22 in a work-from-home status. She will be restricted in lifting more than 5 lbs, and from doing any bending or twisting given her injuries. These accommodations will ensure proper healing and allow her to fully return to work after 1 month.      Sincerely,          Nisha Pepe, DO

## (undated) NOTE — MR AVS SNAPSHOT
[facility] [address] [place] [id number] [phone]               Thank you for choosing us for your health care visit with Chivo Montanez MD.  We are glad to serve you and happy to provide you with this · Read or listen to local air quality reports. These let you know when air quality is poor. · Stay indoors as much as you can on smoggy days. If possible, use air conditioning instead of opening the windows. Air conditioners filter the air.  The filter nee professional's instructions. Follow Up with Our Office     Return in about 1 week (around 1/11/2017).       Allergies as of Jan 04, 2017     Crawfish (Crustaceans) Anaphylaxis    Iodine (Topical)     Nsaids     \"avoid NSAIDS due to PUD hx\" These medications were sent to [facility] [address], [address] AT [address], [phone], [address] [place], [place] [id number]    Hours:  24-hours Phone:  [phone]    Luz Lay

## (undated) NOTE — LETTER
ASTHMA ACTION PLAN for [facility]     : 1970     Date: 2023  Provider:  Katie Houston MD  Phone for doctor or clinic: [facility], [address], [address] [facility],[address]  [facility] [phone]  Deirdre Schmitt can use the colors of a traffic light to help learn about your asthma medicines. 1. Green - Go! % of Personal Best Peak Flow Use controller medicine. Breathing is good  No cough or wheeze  Can work and play Medicine How much to take When to take it    Singulair (Montelukast) 10 mg by mouth daily     Qvar 40 inhaler, 2 puffs twice daily       2. Yellow - Caution. 50-79% Personal Best Peak  Flow. Use reliever medicine to keep an asthma attack from getting bad. Cough  Wheezing  Tight Chest  Wake up at night Medicine How much to take When to take it    Proair HFA (albuterol) inhaler 1-2 puffs every 4-6 hours as needed   Albuterol 1.25 mg nebules, use in nebulizer every 4 to 6 hours as needed        Additional instructions         3. Red - Stop! Danger!  <50% Personal Best Peak  Flow. Take these medications until  Get help from a doctor   Medicine not helping  Breathing is hard and fast  Nose opens wide  Can't walk  Ribs show  Can't talk well Medicine How much to take When to take it    Go to the nearest Emergency Room/Department right now! Additional Instructions If your symptoms do not improve and you cannot contact your doctor, [facility] or call 911 immediately! [x] Asthma Action Plan reviewed with patient (and caregiver if necessary) and a copy of the plan was given to the patient/caregiver. [] Asthma Action Plan reviewed with patient (and caregiver if necessary) on the phone and mailed copy to patient or submitted via [address]      Signatures:  Provider  Katie Houston MD   Patient Caretaker

## (undated) NOTE — LETTER
ASTHMA ACTION PLAN for Jolanta Molina     : 1970     Date: 8/3/2022  Provider:  Nona Rivers MD  Phone for doctor or clinic: [facility], [address], [address] [facility] [place] [phone]  Deirdre Schmitt can use the colors of a traffic light to help learn about your asthma medicines. 1. Green - Go! % of Personal Best Peak Flow Use controller medicine. Breathing is good  No cough or wheeze  Can work and play Medicine How much to take When to take it    Singulair (Montelukast) 10 mg by mouth daily  Qvar 40 inhaler, 2 puffs twice daily      2. Yellow - Caution. 50-79% Personal Best Peak  Flow. Use reliever medicine to keep an asthma attack from getting bad. Cough  Wheezing  Tight Chest  Wake up at night Medicine How much to take When to take it    Albuterol inhaler,  2 puffs every four hours as needed. Ventolin HFA (albuterol) inhaler 1-2 puffs every 4-6 hours as needed       Additional instructions         3. Red - Stop! Danger!  <50% Personal Best Peak  Flow. Take these medications until  Get help from a doctor   Medicine not helping  Breathing is hard and fast  Nose opens wide  Can't walk  Ribs show  Can't talk well Medicine How much to take When to take it    Go to the nearest Emergency Room/Department right now! Additional Instructions If your symptoms do not improve and you cannot contact your doctor, [facility] or call 911 immediately! [x] Asthma Action Plan reviewed with patient (and caregiver if necessary) and a copy of the plan was given to the patient/caregiver. [] Asthma Action Plan reviewed with patient (and caregiver if necessary) on the phone and mailed copy to patient or submitted via [address]      Signatures:  Provider  Nona Rivers MD   Patient Caretaker

## (undated) NOTE — LETTER
ASTHMA ACTION PLAN for Ayesha Zamarripa     : 1970     Date: 3/11/2020  Provider:  Antonia Olmos MD  Phone for doctor or clinic: [facility], [address] [place] [address]# [facility] [phone]

## (undated) NOTE — LETTER
06/11/21        Jay Delacruz [id number]      Dear Yasmine Menon,    [facility] records indicate that you have outstanding lab work and or testing that was ordered for you and has not yet been completed:  Orders Placed This Encount

## (undated) NOTE — LETTER
ASTHMA ACTION PLAN for Anais Harris     : 1970     Date: 2021  Provider:  Josseline Escalona MD  Phone for doctor or clinic: [facility], [address] [facility]# [facility] [phone]

## (undated) NOTE — Clinical Note
ASTHMA ACTION PLAN for Ariana Ramírez     : 1970     Date: 2017  Provider:  Rosita Telles MD  Phone for doctor or clinic: Liana Morin [address]  [phone]  Deirdre Bolton 7623 can use th Signatures:  Provider  Fredrick Ramos MD   Patient Caretaker